# Patient Record
Sex: MALE | Race: WHITE | NOT HISPANIC OR LATINO | Employment: OTHER | URBAN - NONMETROPOLITAN AREA
[De-identification: names, ages, dates, MRNs, and addresses within clinical notes are randomized per-mention and may not be internally consistent; named-entity substitution may affect disease eponyms.]

---

## 2022-04-30 ENCOUNTER — HOSPITAL ENCOUNTER (EMERGENCY)
Facility: HOSPITAL | Age: 27
Discharge: LEFT BEFORE DISCHARGE | End: 2022-04-30

## 2022-04-30 VITALS
TEMPERATURE: 98.9 F | DIASTOLIC BLOOD PRESSURE: 73 MMHG | WEIGHT: 205 LBS | BODY MASS INDEX: 27.77 KG/M2 | SYSTOLIC BLOOD PRESSURE: 117 MMHG | HEART RATE: 90 BPM | OXYGEN SATURATION: 98 % | RESPIRATION RATE: 18 BRPM | HEIGHT: 72 IN

## 2022-04-30 PROCEDURE — 99211 OFF/OP EST MAY X REQ PHY/QHP: CPT

## 2023-02-18 ENCOUNTER — HOSPITAL ENCOUNTER (EMERGENCY)
Facility: HOSPITAL | Age: 28
Discharge: HOME | End: 2023-02-18
Attending: EMERGENCY MEDICINE | Admitting: EMERGENCY MEDICINE
Payer: COMMERCIAL

## 2023-02-18 VITALS
HEART RATE: 83 BPM | DIASTOLIC BLOOD PRESSURE: 91 MMHG | HEIGHT: 72 IN | BODY MASS INDEX: 28.44 KG/M2 | RESPIRATION RATE: 18 BRPM | SYSTOLIC BLOOD PRESSURE: 143 MMHG | TEMPERATURE: 97.8 F | OXYGEN SATURATION: 98 % | WEIGHT: 210 LBS

## 2023-02-18 DIAGNOSIS — J02.9 SORE THROAT: Primary | ICD-10-CM

## 2023-02-18 LAB
FLUAV SUBTYP SPEC NAA+PROBE: NOT DETECTED
FLUBV RNA ISLT QL NAA+PROBE: NOT DETECTED
S PYO AG THROAT QL: NEGATIVE
SARS-COV-2 RNA RESP QL NAA+PROBE: NOT DETECTED

## 2023-02-18 PROCEDURE — 99283 EMERGENCY DEPT VISIT LOW MDM: CPT

## 2023-02-18 PROCEDURE — 87880 STREP A ASSAY W/OPTIC: CPT | Performed by: EMERGENCY MEDICINE

## 2023-02-18 PROCEDURE — 87147 CULTURE TYPE IMMUNOLOGIC: CPT | Performed by: EMERGENCY MEDICINE

## 2023-02-18 PROCEDURE — C9803 HOPD COVID-19 SPEC COLLECT: HCPCS

## 2023-02-18 PROCEDURE — 87636 SARSCOV2 & INF A&B AMP PRB: CPT | Performed by: EMERGENCY MEDICINE

## 2023-02-18 PROCEDURE — 25010000002 DEXAMETHASONE PER 1 MG: Performed by: EMERGENCY MEDICINE

## 2023-02-18 PROCEDURE — 87081 CULTURE SCREEN ONLY: CPT | Performed by: EMERGENCY MEDICINE

## 2023-02-18 RX ORDER — KETOROLAC TROMETHAMINE 30 MG/ML
60 INJECTION, SOLUTION INTRAMUSCULAR; INTRAVENOUS ONCE
Status: DISCONTINUED | OUTPATIENT
Start: 2023-02-18 | End: 2023-02-18

## 2023-02-18 RX ADMIN — DEXAMETHASONE SODIUM PHOSPHATE 10 MG: 10 INJECTION INTRAMUSCULAR; INTRAVENOUS at 08:23

## 2023-02-18 NOTE — ED PROVIDER NOTES
HPI: Vlad Lagunas is a 27 y.o. male who presents to the emergency department complaining of sore throat.  He states that for last 3 days his throat has felt swollen and sore.  This is worse when he swallows.  No fevers, chills, nausea, vomiting, diarrhea.  Some mild intermittent cough.  No sick contacts.      REVIEW OF SYSTEMS: All other systems reviewed and are negative     PAST MEDICAL HISTORY:   History reviewed. No pertinent past medical history.     FAMILY HISTORY:   History reviewed. No pertinent family history.     SOCIAL HISTORY:   Social History     Socioeconomic History   • Marital status: Single   Tobacco Use   • Smoking status: Never   • Smokeless tobacco: Never   Vaping Use   • Vaping Use: Never used   Substance and Sexual Activity   • Alcohol use: Never   • Drug use: Yes     Frequency: 2.0 times per week     Types: Marijuana   • Sexual activity: Yes        SURGICAL HISTORY:   History reviewed. No pertinent surgical history.     ALLERGIES: Amoxicillin and Penicillins       PHYSICAL EXAM:   VITAL SIGNS:   Vitals:    02/18/23 0740   BP: 143/91   Pulse: 83   Resp: 18   Temp: 97.8 °F (36.6 °C)   SpO2: 98%      CONSTITUTIONAL: Awake, well appearing, nontoxic   HENT: Atraumatic, normocephalic, oral mucosa moist, airway patent.  No tonsillar swelling, uvula midline, diffuse erythema.  Nares patent without drainage. External ears normal.   EYES: Conjunctivae clear, EOMI, PERRL   NECK: Trachea midline, nontender, supple   CARDIOVASCULAR: Normal heart rate, Normal rhythm.  PULMONARY/CHEST: Normal work of breathing. Clear to auscultation, no rhonchi, wheezes, or rales.  ABDOMINAL: Nondistended, soft, nontender, no rebound or guarding.  NEUROLOGIC: Nonfocal, moves all four extremities, no gross sensory or motor deficits.   EXTREMITIES: No clubbing, cyanosis, or edema   SKIN: Warm, Dry, No erythema, No rash       ED COURSE / MEDICAL DECISION MAKING:     Vlad Lagunas is a 27 y.o. male who presents to the  emergency department for evaluation of sore throat.  Well-developed, well-nourished young man in no distress with exam as above.  His vital signs are normal.  His oxygen saturation is normal on room air at 98%.  His exam is notable for some erythema to the oropharynx.  No particularly concerning red flag signs or symptoms on history or exam.  Will obtain strep, COVID/flu and give symptomatic treatment.  Disposition is likely to home.    Differential diagnosis includes viral illness, strep pharyngitis among other etiologies.    COVID, flu, strep all negative.  He is resting comfortably and feels improved.  Will discharge home with supportive measures and outpatient follow-up.  Return precautions discussed.  He is happy with this plan.    Final diagnoses:   Sore throat        Jovanni Biswas MD  02/18/23 0933

## 2023-02-20 LAB — BACTERIA SPEC AEROBE CULT: ABNORMAL
